# Patient Record
Sex: FEMALE | Race: WHITE | Employment: OTHER | URBAN - METROPOLITAN AREA
[De-identification: names, ages, dates, MRNs, and addresses within clinical notes are randomized per-mention and may not be internally consistent; named-entity substitution may affect disease eponyms.]

---

## 2019-05-13 ENCOUNTER — NEW REFERRAL (OUTPATIENT)
Dept: URBAN - METROPOLITAN AREA CLINIC 26 | Facility: CLINIC | Age: 59
End: 2019-05-13

## 2019-05-13 VITALS
WEIGHT: 100 LBS | SYSTOLIC BLOOD PRESSURE: 116 MMHG | HEIGHT: 66 IN | BODY MASS INDEX: 16.07 KG/M2 | DIASTOLIC BLOOD PRESSURE: 80 MMHG | HEART RATE: 72 BPM

## 2019-05-13 DIAGNOSIS — H04.123: ICD-10-CM

## 2019-05-13 DIAGNOSIS — H53.8: ICD-10-CM

## 2019-05-13 DIAGNOSIS — H02.834: ICD-10-CM

## 2019-05-13 DIAGNOSIS — H52.4: ICD-10-CM

## 2019-05-13 DIAGNOSIS — H33.311: ICD-10-CM

## 2019-05-13 DIAGNOSIS — H35.3132: ICD-10-CM

## 2019-05-13 DIAGNOSIS — H43.813: ICD-10-CM

## 2019-05-13 DIAGNOSIS — H25.13: ICD-10-CM

## 2019-05-13 DIAGNOSIS — H02.831: ICD-10-CM

## 2019-05-13 PROCEDURE — 92225 OPHTHALMOSCOPY (INITIAL): CPT

## 2019-05-13 PROCEDURE — 92134 CPTRZ OPH DX IMG PST SGM RTA: CPT

## 2019-05-13 PROCEDURE — 67145 PROPH RTA DTCHMNT PC: CPT

## 2019-05-13 PROCEDURE — 92250 FUNDUS PHOTOGRAPHY W/I&R: CPT

## 2019-05-13 PROCEDURE — 99204 OFFICE O/P NEW MOD 45 MIN: CPT

## 2019-05-13 PROCEDURE — 92235 FLUORESCEIN ANGRPH MLTIFRAME: CPT

## 2019-05-13 ASSESSMENT — TONOMETRY
OD_IOP_MMHG: 10
OS_IOP_MMHG: 15

## 2019-05-13 ASSESSMENT — VISUAL ACUITY
OD_SC: 20/200-1
OS_SC: 20/200-2

## 2019-05-21 ENCOUNTER — ADDENDUM (OUTPATIENT)
Dept: URBAN - METROPOLITAN AREA CLINIC 26 | Facility: CLINIC | Age: 59
End: 2019-05-21

## 2020-07-23 ENCOUNTER — TELEPHONE ENCOUNTER (OUTPATIENT)
Dept: URBAN - METROPOLITAN AREA CLINIC 9 | Facility: CLINIC | Age: 60
End: 2020-07-23

## 2020-07-29 ENCOUNTER — OFFICE VISIT (OUTPATIENT)
Dept: URBAN - METROPOLITAN AREA CLINIC 7 | Facility: CLINIC | Age: 60
End: 2020-07-29

## 2020-08-12 ENCOUNTER — OFFICE VISIT (OUTPATIENT)
Dept: URBAN - METROPOLITAN AREA CLINIC 7 | Facility: CLINIC | Age: 60
End: 2020-08-12

## 2020-08-24 ENCOUNTER — TELEPHONE ENCOUNTER (OUTPATIENT)
Dept: URBAN - METROPOLITAN AREA CLINIC 9 | Facility: CLINIC | Age: 60
End: 2020-08-24

## 2020-08-26 ENCOUNTER — OFFICE VISIT (OUTPATIENT)
Dept: URBAN - METROPOLITAN AREA CLINIC 7 | Facility: CLINIC | Age: 60
End: 2020-08-26

## 2020-09-29 ENCOUNTER — OFFICE VISIT (OUTPATIENT)
Dept: URBAN - METROPOLITAN AREA CLINIC 60 | Facility: CLINIC | Age: 60
End: 2020-09-29

## 2021-08-04 ENCOUNTER — TELEPHONE ENCOUNTER (OUTPATIENT)
Dept: URBAN - METROPOLITAN AREA CLINIC 9 | Facility: CLINIC | Age: 61
End: 2021-08-04

## 2022-01-28 ENCOUNTER — TELEPHONE ENCOUNTER (OUTPATIENT)
Dept: URBAN - METROPOLITAN AREA CLINIC 9 | Facility: CLINIC | Age: 62
End: 2022-01-28

## 2022-07-09 ENCOUNTER — TELEPHONE ENCOUNTER (OUTPATIENT)
Dept: URBAN - METROPOLITAN AREA CLINIC 121 | Facility: CLINIC | Age: 62
End: 2022-07-09

## 2022-07-09 RX ORDER — FUROSEMIDE 40 MG/1
TABLET ORAL ONCE A DAY
Refills: 0 | OUTPATIENT
Start: 2016-04-08 | End: 2016-06-30

## 2022-07-09 RX ORDER — LACTULOSE 10 G/15ML
30ML PO TID SOLUTION ORAL TAKE AS DIRECTED
Refills: 0 | OUTPATIENT
Start: 2016-06-30 | End: 2016-10-07

## 2022-07-09 RX ORDER — SPIRONOLACTONE 100 MG/1
TABLET, FILM COATED ORAL TWICE A DAY
Refills: 0 | OUTPATIENT
Start: 2016-04-08 | End: 2016-06-30

## 2022-07-09 RX ORDER — LACTULOSE 10 G/15ML
30ML PO BID SOLUTION ORAL TAKE AS DIRECTED
Refills: 0 | OUTPATIENT
Start: 2015-11-16 | End: 2015-12-04

## 2022-07-09 RX ORDER — SPIRONOLACTONE 100 MG/1
TABLET, FILM COATED ORAL
Refills: 0 | OUTPATIENT
Start: 2015-12-04 | End: 2015-12-17

## 2022-07-09 RX ORDER — TEMAZEPAM 15 MG/1
CAPSULE ORAL
Refills: 0 | OUTPATIENT
Start: 2016-09-14 | End: 2017-04-13

## 2022-07-09 RX ORDER — SPIRONOLACTONE 100 MG/1
TABLET, FILM COATED ORAL
Refills: 0 | OUTPATIENT
Start: 2015-12-17 | End: 2016-02-11

## 2022-07-09 RX ORDER — SPIRONOLACTONE 100 MG/1
TWICE A DAY TABLET, FILM COATED ORAL TWICE A DAY
Refills: 2 | OUTPATIENT
Start: 2016-08-25 | End: 2016-10-07

## 2022-07-09 RX ORDER — TEMAZEPAM 15 MG/1
CAPSULE ORAL
Refills: 0 | OUTPATIENT
Start: 2016-06-13 | End: 2016-06-30

## 2022-07-09 RX ORDER — FUROSEMIDE 20 MG/1
TABLET ORAL
Refills: 0 | OUTPATIENT
Start: 2015-12-04 | End: 2015-12-17

## 2022-07-09 RX ORDER — LACTULOSE 10 G/15ML
30ML PO TID SOLUTION ORAL TAKE AS DIRECTED
Refills: 0 | OUTPATIENT
Start: 2016-10-07 | End: 2017-04-13

## 2022-07-09 RX ORDER — LACTULOSE 10 G/15ML
30ML PO BID SOLUTION ORAL TAKE AS DIRECTED
Refills: 0 | OUTPATIENT
Start: 2016-04-08 | End: 2016-04-08

## 2022-07-09 RX ORDER — LACTULOSE 10 G/15ML
SOLUTION ORAL
Refills: 0 | OUTPATIENT
Start: 2015-11-16 | End: 2015-11-16

## 2022-07-09 RX ORDER — FUROSEMIDE 20 MG/1
TABLET ORAL
Refills: 0 | OUTPATIENT
Start: 2016-02-11 | End: 2016-04-08

## 2022-07-09 RX ORDER — FUROSEMIDE 40 MG/1
ONCE A DAY TABLET ORAL ONCE A DAY
Refills: 6 | OUTPATIENT
Start: 2017-01-23 | End: 2017-04-13

## 2022-07-09 RX ORDER — OXYCODONE HYDROCHLORIDE 5 MG/1
TABLET ORAL ONCE A DAY
Refills: 0 | OUTPATIENT
Start: 2016-06-19 | End: 2016-06-30

## 2022-07-09 RX ORDER — SPIRONOLACTONE 100 MG/1
TAKE 1 TABLET BY MOUTH TWICE A DAY TABLET, FILM COATED ORAL
Refills: 6 | OUTPATIENT
Start: 2016-12-14 | End: 2017-04-13

## 2022-07-09 RX ORDER — SPIRONOLACTONE 100 MG/1
TABLET, FILM COATED ORAL
Refills: 0 | OUTPATIENT
Start: 2016-02-11 | End: 2016-04-08

## 2022-07-09 RX ORDER — LACTULOSE 10 G/15ML
30ML PO BID SOLUTION ORAL TAKE AS DIRECTED
Refills: 0 | OUTPATIENT
Start: 2015-12-17 | End: 2016-02-11

## 2022-07-09 RX ORDER — OXYCODONE HYDROCHLORIDE 5 MG/1
CAPSULE ORAL
Refills: 0 | OUTPATIENT
Start: 2016-09-14 | End: 2017-04-13

## 2022-07-09 RX ORDER — PREDNISONE 20 MG/1
2 TAB BID TABLET ORAL
Refills: 0 | OUTPATIENT
Start: 2015-12-04 | End: 2015-12-17

## 2022-07-09 RX ORDER — FUROSEMIDE 20 MG/1
TABLET ORAL
Refills: 0 | OUTPATIENT
Start: 2015-11-16 | End: 2015-12-04

## 2022-07-09 RX ORDER — FUROSEMIDE 40 MG/1
TABLET ORAL ONCE A DAY
Refills: 0 | OUTPATIENT
Start: 2016-10-07 | End: 2017-04-13

## 2022-07-09 RX ORDER — FUROSEMIDE 20 MG/1
TABLET ORAL ONCE A DAY
Refills: 0 | OUTPATIENT
Start: 2016-04-08 | End: 2016-04-08

## 2022-07-09 RX ORDER — PREDNISONE 20 MG/1
2 TAB BID TABLET ORAL
Refills: 0 | OUTPATIENT
Start: 2015-11-16 | End: 2015-12-04

## 2022-07-09 RX ORDER — LACTULOSE 10 G/15ML
TAKE 30ML PO QD SOLUTION ORAL TAKE AS DIRECTED
Refills: 2 | OUTPATIENT
Start: 2016-08-25 | End: 2016-10-07

## 2022-07-09 RX ORDER — OXYCODONE HYDROCHLORIDE 5 MG/1
TABLET ORAL AS NEEDED
Refills: 0 | OUTPATIENT
Start: 2015-06-17 | End: 2015-11-16

## 2022-07-09 RX ORDER — LACTULOSE 10 G/15ML
30ML PO BID SOLUTION ORAL TAKE AS DIRECTED
Refills: 0 | OUTPATIENT
Start: 2015-12-04 | End: 2015-12-17

## 2022-07-09 RX ORDER — SPIRONOLACTONE 100 MG/1
TABLET, FILM COATED ORAL TWICE A DAY
Refills: 0 | OUTPATIENT
Start: 2016-06-30 | End: 2016-10-07

## 2022-07-09 RX ORDER — SPIRONOLACTONE 100 MG/1
TABLET, FILM COATED ORAL
Refills: 0 | OUTPATIENT
Start: 2015-11-16 | End: 2015-12-04

## 2022-07-09 RX ORDER — LACTULOSE 10 G/15ML
30ML PO TID SOLUTION ORAL TAKE AS DIRECTED
Refills: 0 | OUTPATIENT
Start: 2016-04-08 | End: 2016-06-30

## 2022-07-09 RX ORDER — SPIRONOLACTONE 100 MG/1
TABLET, FILM COATED ORAL TWICE A DAY
Refills: 0 | OUTPATIENT
Start: 2016-10-07 | End: 2017-04-13

## 2022-07-09 RX ORDER — LACTULOSE 10 G/15ML
30ML PO BID SOLUTION ORAL TAKE AS DIRECTED
Refills: 0 | OUTPATIENT
Start: 2016-02-11 | End: 2016-04-08

## 2022-07-09 RX ORDER — FUROSEMIDE 40 MG/1
TWICE A DAY TABLET ORAL TWICE A DAY
Refills: 3 | OUTPATIENT
Start: 2015-11-16 | End: 2015-12-04

## 2022-07-09 RX ORDER — FUROSEMIDE 20 MG/1
TABLET ORAL
Refills: 0 | OUTPATIENT
Start: 2015-12-17 | End: 2016-02-11

## 2022-07-09 RX ORDER — FUROSEMIDE 40 MG/1
TABLET ORAL ONCE A DAY
Refills: 0 | OUTPATIENT
Start: 2016-06-30 | End: 2016-10-07

## 2022-07-09 RX ORDER — SPIRONOLACTONE 100 MG/1
TABLET, FILM COATED ORAL
Refills: 0 | OUTPATIENT
Start: 2016-04-08 | End: 2016-04-08

## 2022-07-10 ENCOUNTER — TELEPHONE ENCOUNTER (OUTPATIENT)
Dept: URBAN - METROPOLITAN AREA CLINIC 121 | Facility: CLINIC | Age: 62
End: 2022-07-10

## 2022-07-10 RX ORDER — LACTULOSE 10 G/15ML
ONCE A DAY. 30 CC PO DAILY SOLUTION ORAL ONCE A DAY
Refills: 5 | Status: ACTIVE | COMMUNITY
Start: 2016-10-07

## 2022-07-10 RX ORDER — LACTULOSE 10 G/15ML
TWICE A DAY. 30 CC PO TWICE A DAY SOLUTION ORAL TWICE A DAY
Refills: 3 | Status: ACTIVE | COMMUNITY
Start: 2017-04-13

## 2022-07-10 RX ORDER — SPIRONOLACTONE 100 MG/1
TABLET, FILM COATED ORAL TWICE A DAY
Refills: 0 | Status: ACTIVE | COMMUNITY
Start: 2017-04-13

## 2022-07-10 RX ORDER — FUROSEMIDE 40 MG/1
TABLET ORAL ONCE A DAY
Refills: 0 | Status: ACTIVE | COMMUNITY
Start: 2017-04-13

## 2022-07-10 RX ORDER — SPIRONOLACTONE 100 MG/1
TWICE A DAY TABLET, FILM COATED ORAL TWICE A DAY
Refills: 6 | Status: ACTIVE | COMMUNITY
Start: 2017-06-05

## 2022-07-10 RX ORDER — OXYCODONE HYDROCHLORIDE 5 MG/1
TABLET ORAL ONCE A DAY
Refills: 0 | Status: ACTIVE | COMMUNITY
Start: 2016-06-30

## 2022-07-10 RX ORDER — FUROSEMIDE 40 MG/1
ONCE A DAY TABLET ORAL ONCE A DAY
Refills: 3 | Status: ACTIVE | COMMUNITY
Start: 2017-08-18

## 2022-07-10 RX ORDER — OXYCODONE HYDROCHLORIDE 5 MG/1
CAPSULE ORAL
Refills: 0 | Status: ACTIVE | COMMUNITY
Start: 2017-04-13

## 2022-07-10 RX ORDER — TEMAZEPAM 15 MG/1
CAPSULE ORAL
Refills: 0 | Status: ACTIVE | COMMUNITY
Start: 2016-06-30

## 2022-07-10 RX ORDER — SPIRONOLACTONE 100 MG/1
TWICE A DAY TABLET, FILM COATED ORAL TWICE A DAY
Refills: 0 | Status: ACTIVE | COMMUNITY
Start: 2017-11-06

## 2022-07-10 RX ORDER — TEMAZEPAM 15 MG/1
CAPSULE ORAL
Refills: 0 | Status: ACTIVE | COMMUNITY
Start: 2017-04-13

## 2022-07-30 ENCOUNTER — TELEPHONE ENCOUNTER (OUTPATIENT)
Age: 62
End: 2022-07-30

## 2022-07-30 RX ORDER — LACTULOSE 10 G/15ML
30 SOLUTION ORAL
Qty: 0 | Refills: 5 | OUTPATIENT
Start: 2020-03-09 | End: 2020-07-29

## 2022-07-30 RX ORDER — FUROSEMIDE 20 MG/1
1 (ONE) TABLET ORAL
Qty: 0 | Refills: 2 | OUTPATIENT
Start: 2020-07-23 | End: 2020-07-26

## 2022-07-31 ENCOUNTER — TELEPHONE ENCOUNTER (OUTPATIENT)
Age: 62
End: 2022-07-31

## 2022-07-31 RX ORDER — LACTULOSE 10 G/15ML
30 SOLUTION ORAL
Qty: 0 | Refills: 5 | Status: ACTIVE | COMMUNITY
Start: 2020-03-09

## 2022-07-31 RX ORDER — FUROSEMIDE 20 MG/1
1 (ONE) TABLET ORAL
Qty: 0 | Refills: 2 | Status: ACTIVE | COMMUNITY
Start: 2020-07-23

## 2022-09-14 ENCOUNTER — P2P PATIENT RECORD (OUTPATIENT)
Age: 62
End: 2022-09-14

## 2022-09-23 ENCOUNTER — P2P PATIENT RECORD (OUTPATIENT)
Age: 62
End: 2022-09-23

## 2022-10-13 ENCOUNTER — OFFICE VISIT (OUTPATIENT)
Dept: URBAN - METROPOLITAN AREA CLINIC 60 | Facility: CLINIC | Age: 62
End: 2022-10-13

## 2022-11-03 ENCOUNTER — LAB OUTSIDE AN ENCOUNTER (OUTPATIENT)
Dept: URBAN - METROPOLITAN AREA CLINIC 60 | Facility: CLINIC | Age: 62
End: 2022-11-03

## 2022-11-03 ENCOUNTER — DASHBOARD ENCOUNTERS (OUTPATIENT)
Age: 62
End: 2022-11-03

## 2022-11-03 ENCOUNTER — OFFICE VISIT (OUTPATIENT)
Dept: URBAN - METROPOLITAN AREA CLINIC 60 | Facility: CLINIC | Age: 62
End: 2022-11-03
Payer: COMMERCIAL

## 2022-11-03 ENCOUNTER — WEB ENCOUNTER (OUTPATIENT)
Dept: URBAN - METROPOLITAN AREA CLINIC 60 | Facility: CLINIC | Age: 62
End: 2022-11-03

## 2022-11-03 VITALS
HEIGHT: 66 IN | SYSTOLIC BLOOD PRESSURE: 130 MMHG | RESPIRATION RATE: 12 BRPM | BODY MASS INDEX: 17.16 KG/M2 | DIASTOLIC BLOOD PRESSURE: 64 MMHG | HEART RATE: 80 BPM | OXYGEN SATURATION: 99 % | TEMPERATURE: 98.7 F | WEIGHT: 106.8 LBS

## 2022-11-03 DIAGNOSIS — K70.30 ALCOHOLIC CIRRHOSIS, UNSPECIFIED WHETHER ASCITES PRESENT: ICD-10-CM

## 2022-11-03 DIAGNOSIS — K63.5 POLYP OF COLON, UNSPECIFIED PART OF COLON, UNSPECIFIED TYPE: ICD-10-CM

## 2022-11-03 PROBLEM — 420054005: Status: ACTIVE | Noted: 2022-11-03

## 2022-11-03 PROCEDURE — 99203 OFFICE O/P NEW LOW 30 MIN: CPT | Performed by: INTERNAL MEDICINE

## 2022-11-03 RX ORDER — TEMAZEPAM 15 MG/1
CAPSULE ORAL
Refills: 0 | Status: ACTIVE | COMMUNITY
Start: 2016-06-30

## 2022-11-03 RX ORDER — FUROSEMIDE 40 MG/1
TABLET ORAL ONCE A DAY
Refills: 0 | Status: ACTIVE | COMMUNITY
Start: 2017-04-13

## 2022-11-03 RX ORDER — OXYCODONE HYDROCHLORIDE 5 MG/1
TABLET ORAL ONCE A DAY
Refills: 0 | Status: ACTIVE | COMMUNITY
Start: 2016-06-30

## 2022-11-03 RX ORDER — SPIRONOLACTONE 100 MG/1
TWICE A DAY TABLET, FILM COATED ORAL TWICE A DAY
Refills: 6 | Status: ACTIVE | COMMUNITY
Start: 2017-06-05
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
## 2025-05-28 ENCOUNTER — P2P PATIENT RECORD (OUTPATIENT)
Age: 65
End: 2025-05-28

## 2025-07-31 ENCOUNTER — OFFICE VISIT (OUTPATIENT)
Dept: URBAN - METROPOLITAN AREA CLINIC 60 | Facility: CLINIC | Age: 65
End: 2025-07-31

## 2025-07-31 RX ORDER — SPIRONOLACTONE 100 MG/1
TWICE A DAY TABLET, FILM COATED ORAL TWICE A DAY
Refills: 6 | Status: ACTIVE | COMMUNITY
Start: 2017-06-05

## 2025-07-31 RX ORDER — OXYCODONE HYDROCHLORIDE 5 MG/1
TABLET ORAL ONCE A DAY
Refills: 0 | Status: ACTIVE | COMMUNITY
Start: 2016-06-30

## 2025-07-31 RX ORDER — TEMAZEPAM 15 MG/1
CAPSULE ORAL
Refills: 0 | Status: ACTIVE | COMMUNITY
Start: 2016-06-30

## 2025-07-31 RX ORDER — FUROSEMIDE 40 MG/1
TABLET ORAL ONCE A DAY
Refills: 0 | Status: ACTIVE | COMMUNITY
Start: 2017-04-13
